# Patient Record
Sex: FEMALE | Race: WHITE | NOT HISPANIC OR LATINO | Employment: PART TIME | URBAN - NONMETROPOLITAN AREA
[De-identification: names, ages, dates, MRNs, and addresses within clinical notes are randomized per-mention and may not be internally consistent; named-entity substitution may affect disease eponyms.]

---

## 2020-10-17 ENCOUNTER — HOSPITAL ENCOUNTER (EMERGENCY)
Facility: HOSPITAL | Age: 40
Discharge: HOME | End: 2020-10-17
Attending: NURSE PRACTITIONER | Admitting: NURSE PRACTITIONER
Payer: OTHER GOVERNMENT

## 2020-10-17 VITALS
DIASTOLIC BLOOD PRESSURE: 86 MMHG | TEMPERATURE: 99.3 F | SYSTOLIC BLOOD PRESSURE: 113 MMHG | WEIGHT: 175 LBS | RESPIRATION RATE: 18 BRPM | OXYGEN SATURATION: 98 % | HEART RATE: 80 BPM

## 2020-10-17 DIAGNOSIS — Z20.822 SUSPECTED COVID-19 VIRUS INFECTION: ICD-10-CM

## 2020-10-17 DIAGNOSIS — Z72.0 TOBACCO ABUSE: ICD-10-CM

## 2020-10-17 DIAGNOSIS — J06.9 ACUTE URI: ICD-10-CM

## 2020-10-17 DIAGNOSIS — F17.210 CIGARETTE SMOKER: ICD-10-CM

## 2020-10-17 LAB
SPECIMEN SOURCE: NORMAL
STREP GROUP A PCR: NOT DETECTED

## 2020-10-17 PROCEDURE — U0003 INFECTIOUS AGENT DETECTION BY NUCLEIC ACID (DNA OR RNA); SEVERE ACUTE RESPIRATORY SYNDROME CORONAVIRUS 2 (SARS-COV-2) (CORONAVIRUS DISEASE [COVID-19]), AMPLIFIED PROBE TECHNIQUE, MAKING USE OF HIGH THROUGHPUT TECHNOLOGIES AS DESCRIBED BY CMS-2020-01-R: HCPCS | Performed by: NURSE PRACTITIONER

## 2020-10-17 PROCEDURE — G0463 HOSPITAL OUTPT CLINIC VISIT: HCPCS

## 2020-10-17 PROCEDURE — 99213 OFFICE O/P EST LOW 20 MIN: CPT | Performed by: NURSE PRACTITIONER

## 2020-10-17 PROCEDURE — C9803 HOPD COVID-19 SPEC COLLECT: HCPCS

## 2020-10-17 PROCEDURE — 87651 STREP A DNA AMP PROBE: CPT | Performed by: NURSE PRACTITIONER

## 2020-10-17 ASSESSMENT — ENCOUNTER SYMPTOMS
WHEEZING: 1
ARTHRALGIAS: 1
SORE THROAT: 1
CHEST TIGHTNESS: 0
VOMITING: 0
LIGHT-HEADEDNESS: 0
SINUS PAIN: 1
ABDOMINAL PAIN: 0
FATIGUE: 1
FEVER: 1
SINUS PRESSURE: 1
COUGH: 1
CHILLS: 1
SHORTNESS OF BREATH: 0
ACTIVITY CHANGE: 1
TROUBLE SWALLOWING: 0
FACIAL SWELLING: 0
EYE REDNESS: 0
NAUSEA: 0
DIZZINESS: 0
MYALGIAS: 1
DIARRHEA: 0
EYE PAIN: 0
HEADACHES: 1
EYE ITCHING: 0

## 2020-10-17 NOTE — ED PROVIDER NOTES
History     Chief Complaint   Patient presents with     Otitis Media     c/o bilateral ear pain starting Wednes day night     Fever     feels like it broke this am.      Pharyngitis     started Wednesday.      HPI  Fiona Mcclain is a 40 year old female who presents with her daughter with a fever, nasal congestion, fatigue, body aches, and a productive cough times 3 days. Highest temp was 101 orally. Still eating and drinking well. Some loss of smell and taste. No one else is ill around her, but she does work at [employer]. She has taken Motrin with some relief in her symptoms. She does smoke. No h/o asthma or COPD.     Allergies:  Allergies   Allergen Reactions     Allegra [Fexofenadine]        Problem List:    There are no active problems to display for this patient.       Past Medical History:    History reviewed. No pertinent past medical history.    Past Surgical History:    History reviewed. No pertinent surgical history.    Family History:    No family history on file.    Social History:  Marital Status:   [2]  Social History     Tobacco Use     Smoking status: None   Substance Use Topics     Alcohol use: None     Drug use: None        Medications:    No current outpatient medications on file.        Review of Systems   Constitutional: Positive for activity change, chills, fatigue and fever.   HENT: Positive for congestion, ear pain, sinus pressure, sinus pain, sneezing and sore throat. Negative for ear discharge, facial swelling, nosebleeds and trouble swallowing.    Eyes: Negative for pain, redness and itching.   Respiratory: Positive for cough and wheezing. Negative for chest tightness and shortness of breath.    Cardiovascular: Negative for chest pain.   Gastrointestinal: Negative for abdominal pain, diarrhea, nausea and vomiting.   Musculoskeletal: Positive for arthralgias and myalgias.   Skin: Negative for rash.   Neurological: Positive for headaches. Negative for dizziness and  light-headedness.       Physical Exam   BP: 113/86  Pulse: 80  Temp: 99.3  F (37.4  C)  Resp: 18  Weight: 79.4 kg (175 lb)  SpO2: 98 %      Physical Exam  Vitals signs and nursing note reviewed.   Constitutional:       General: She is not in acute distress.     Appearance: She is not ill-appearing, toxic-appearing or diaphoretic.   HENT:      Head: Normocephalic and atraumatic.      Right Ear: Tympanic membrane and ear canal normal. No drainage, swelling or tenderness. No middle ear effusion.      Left Ear: Tympanic membrane and ear canal normal. No drainage, swelling or tenderness.  No middle ear effusion.      Nose: No congestion.      Mouth/Throat:      Mouth: Mucous membranes are moist.      Pharynx: Oropharynx is clear. No oropharyngeal exudate or posterior oropharyngeal erythema.   Eyes:      Conjunctiva/sclera: Conjunctivae normal.      Pupils: Pupils are equal, round, and reactive to light.   Neck:      Musculoskeletal: Normal range of motion and neck supple.   Cardiovascular:      Rate and Rhythm: Normal rate and regular rhythm.      Heart sounds: Normal heart sounds.   Pulmonary:      Effort: Pulmonary effort is normal. No respiratory distress.      Breath sounds: Normal breath sounds. No wheezing.   Abdominal:      Palpations: Abdomen is soft.      Tenderness: There is no abdominal tenderness.   Neurological:      General: No focal deficit present.      Mental Status: She is alert.   Psychiatric:         Mood and Affect: Mood normal.         Behavior: Behavior normal.         ED Course        No results found for this or any previous visit (from the past 24 hour(s)).    Medications - No data to display    Assessments & Plan (with Medical Decision Making)     I have reviewed the nursing notes.    I have reviewed the findings, diagnosis, plan and need for follow up with the patient.  (Z20.828) Suspected COVID-19 virus infection  (J06.9) Acute URI  (Z72.0) Tobacco abuse  Plan: Most likely Covid positive.  Swab collected. Will quarantine until results available. Does not appear in distress. Oxygen sats 98 percent. Symptomatic cares encouraged. The lack of efficacy of antibiotics was discussed. The patient will follow up with new or worsening symptoms. Smoking cessation encouraged.       There are no discharge medications for this patient.      Final diagnoses:   Suspected COVID-19 virus infection   Acute URI   Tobacco abuse       10/17/2020   [facility]     Perla Yuan NP  10/17/20 [id number]

## 2020-10-17 NOTE — DISCHARGE INSTRUCTIONS
Quarantine at home until Covid swab is back. If positive, needs to stay home for 10 days and be fever free times 24 hours until returning to work. Return here with new or worsening symptoms.

## 2020-10-17 NOTE — ED TRIAGE NOTES
"Pt is here with c/o fever 100.1 last three days \"broke this am\"  Congestion, cough onset yesterday   throat pain   Body aches     "

## 2020-10-17 NOTE — LETTER
October 17, 2020      Rigobertoiferjovanny Mcclain  [address]  [place] [address]        To Whom It May Concern:    Rigobertoiferjovanny Mcclain  was seen on 10/17/2020.  Please excuse her until she has a negative Covid swab. If positive, needs to stay home for 10 days and also be fever free times 24 hours.         Sincerely,        Perla Yuna, CNP

## 2020-10-17 NOTE — ED AVS SNAPSHOT
[facility]  [address]  [place] [address]  Phone: [phone]                                    Fiona Mcclain   MRN: [id number]    Department: [facility]   Date of Visit: 10/17/2020           After Visit Summary Signature Page    I have received my discharge instructions, and my questions have been answered. I have discussed any challenges I see with this plan with the nurse or doctor.    ..........................................................................................................................................  Patient/Patient Representative Signature      ..........................................................................................................................................  Patient Representative Print Name and Relationship to Patient    ..................................................               ................................................  Date                                   Time    ..........................................................................................................................................  Reviewed by Signature/Title    ...................................................              ..............................................  Date                                               Time          22EPIC Rev 08/18

## 2020-10-19 ENCOUNTER — TELEPHONE (OUTPATIENT)
Dept: NURSING | Facility: CLINIC | Age: 40
End: 2020-10-19

## 2020-10-19 LAB
SARS-COV-2 RNA SPEC QL NAA+PROBE: ABNORMAL
SPECIMEN SOURCE: ABNORMAL

## 2020-10-20 ENCOUNTER — PATIENT OUTREACH (OUTPATIENT)
Dept: CARE COORDINATION | Facility: CLINIC | Age: 40
End: 2020-10-20

## 2020-10-20 DIAGNOSIS — U07.1 2019 NOVEL CORONAVIRUS DISEASE (COVID-19): Primary | ICD-10-CM

## 2020-10-20 NOTE — PROGRESS NOTES
Clinic Care Coordination Contact    Clinic Care Coordination Contact     Follow Up Progress Note      Reason for Referral:      Intervention/Education provided during outreach: CHW spoke with patient. Reviewed Hospital COVID discharge instructions. Patient  DID NOT accept assistance from CHW to schedule PCP follow up appointment with PCP at [facility].      Plan: Patient is wanting to look into her insurance to see who is in network and will find a PCP on her own.     No further Outreach will be done at this time.

## 2020-10-20 NOTE — TELEPHONE ENCOUNTER
"Coronavirus (COVID-19) Notification    Caller Name (Patient, parent, daughter/son, grandparent, etc)  Patient: Fiona Mcclain    Reason for call  Notify of Positive Coronavirus (COVID-19) lab results, assess symptoms,  review  [facility] recommendations    Lab Result    Lab test:  2019-nCoV rRt-PCR or SARS-CoV-2 PCR    Oropharyngeal AND/OR nasopharyngeal swabs is POSITIVE for 2019-nCoV RNA/SARS-COV-2 PCR (COVID-19 virus)    RN Recommendations/Instructions per [facility] Coronavirus COVID-19 recommendations    Brief introduction script  Introduce self then review script:  \"I am calling on behalf of [employer].  We were notified that your Coronavirus test (COVID-19) for was POSITIVE for the virus.  I have some information to relay to you but first I wanted to mention that the [place] Dept of Health will be contacting you shortly [it's possible MD already called Patient] to talk to you more about how you are feeling and other people you have had contact with who might now also have the virus.  Also,  [facility] is Partnering with the [facility] for Covid-19 research, you may be contacted directly by research staff.\"    Assessment (Inquire about Patient's current symptoms)   Assessment   Current Symptoms at time of phone call: (if no symptoms, document No symptoms] Loss of smell and taste, headache   Symptoms onset (if applicable) 10/14/2020     If at time of call, Patients symptoms hare worsened, the Patient should contact 911 or have someone drive them to Emergency Dept promptly:      If Patient calling 911, inform 911 personal that you have tested positive for the Coronavirus (COVID-19).  Place mask on and await 911 to arrive.    If Emergency Dept, If possible, please have another adult drive you to the Emergency Dept but you need to wear mask when in contact with other people.      Review information with Patient    Your result was positive. This means you have COVID-19 (coronavirus).  " We have sent you a letter that reviews the information that I'll be reviewing with you now.    How can I protect others?    If you have symptoms: stay home and away from others (self-isolate) until:    You've had no fever--and no medicine that reduces fever--for 1 full day (24 hours). And       Your other symptoms have gotten better. For example, your cough or breathing has improved. And     At least 10 days have passed since your symptoms started. (If you've been told by a doctor that you have a weak immune system, wait 20 days.)     If you don't have symptoms: Stay home and away from others (self-isolate) until at least 10 days have passed since your first positive COVID-19 test. (Date test collected)    During this time:    Stay in your own room, including for meals. Use your own bathroom if you can.    Stay away from others in your home. No hugging, kissing or shaking hands. No visitors.     Don't go to work, school or anywhere else.     Clean  high touch  surfaces often (doorknobs, counters, handles, etc.). Use a household cleaning spray or wipes. You'll find a full list on the EPA website at www.epa.gov/pesticide-registration/list-n-disinfectants-use-against-sars-cov-2.     Cover your mouth and nose with a mask, tissue or other face covering to avoid spreading germs.    Wash your hands and face often with soap and water.    Caregivers in these groups are at risk for severe illness due to COVID-19:  o People 65 years and older  o People who live in a nursing home or long-term care facility  o People with chronic disease (lung, heart, cancer, diabetes, kidney, liver, immunologic)  o People who have a weakened immune system, including those who:  - Are in cancer treatment  - Take medicine that weakens the immune system, such as corticosteroids  - Had a bone marrow or organ transplant  - Have an immune deficiency  - Have poorly controlled HIV or AIDS  - Are obese (body mass index of 40 or higher)  - Smoke  regularly    Caregivers should wear gloves while washing dishes, handling laundry and cleaning bedrooms and bathrooms.    Wash and dry laundry with special caution. Don't shake dirty laundry, and use the warmest water setting you can.    If you have a weakened immune system, ask your doctor about other actions you should take.    For more tips, go to www.cdc.gov/coronavirus/2019-ncov/downloads/10Things.pdf.    You should not go back to work until you meet the guidelines above for ending your home isolation. You don't need to be retested for COVID-19 before going back to work--studies show that you won't spread the virus if it's been at least 10 days since your symptoms started (or 20 days, if you have a weak immune system).    Employers: This document serves as formal notice of your employee's medical guidelines for going back to work. They must meet the above guidelines before going back to work in person.    How can I take care of myself?    1. Get lots of rest. Drink extra fluids (unless a doctor has told you not to).    2. Take Tylenol (acetaminophen) for fever or pain. If you have liver or kidney problems, ask your family doctor if it's okay to take Tylenol.     Take either:     650 mg (two 325 mg pills) every 4 to 6 hours, or     1,000 mg (two 500 mg pills) every 8 hours as needed.     Note: Don't take more than 3,000 mg in one day. Acetaminophen is found in many medicines (both prescribed and over-the-counter medicines). Read all labels to be sure you don't take too much.    For children, check the Tylenol bottle for the right dose (based on their age or weight).    3. If you have other health problems (like cancer, heart failure, an organ transplant or severe kidney disease): Call your specialty clinic if you don't feel better in the next 2 days.    4. Know when to call 911: Emergency warning signs include:    Trouble breathing or shortness of breath    Pain or pressure in the chest that doesn't go  away    Feeling confused like you haven't felt before, or not being able to wake up    Bluish-colored lips or face    5. Sign up for [employer]. We know it's scary to hear that you have COVID-19. We want to track your symptoms to make sure you're okay over the next 2 weeks. Please look for an email from [employer]--this is a free, online program that we'll use to keep in touch. To sign up, follow the link in the email. Learn more at [email or web address].    Where can I get more information?    [facility]: [email or web address]/    Coronavirus Basics: www.health.[facility].[place]./diseases/coronavirus/basics.html    What to Do If You're Sick: www.cdc.gov/coronavirus/2019-ncov/about/steps-when-sick.html    Ending Home Isolation: www.cdc.gov/coronavirus/2019-ncov/hcp/disposition-in-home-patients.html     Caring for Someone with COVID-19: www.cdc.gov/coronavirus/2019-ncov/if-you-are-sick/care-for-someone.html     [facility] clinical trials (COVID-19 research studies): clinicalaffairs.[facility]/n-clinical-trials     A Positive COVID-19 letter will be sent via [employer] or the mail. (Exception, no letters sent to Presurgerical/Preprocedure Patients)    [Name]  Redd Cobos RN  [facility]

## 2021-11-24 ENCOUNTER — TELEPHONE (OUTPATIENT)
Dept: FAMILY MEDICINE | Facility: OTHER | Age: 41
End: 2021-11-24
Payer: OTHER GOVERNMENT

## 2021-11-24 NOTE — TELEPHONE ENCOUNTER
Tried calling pt to schedule a covid swab for an upcoming procedure. Left message to call [phone] ext [phone] to schedule.    DOS 12-13-21 @ [facility] w/ Dr Murdock (draw date 12-9-21)  *Did see pt was scheduled at [facility] for this one but can be scheduled here if pt doesn't want to go to [place] for it.      DOS 12-13-21 @ [facility] w/ Dr Murdock FAX RESULTS [phone] (draw date 12-26-21)

## 2021-11-29 NOTE — H&P (VIEW-ONLY)
[facility]  [address]  [place] [address]  Phone: [phone]  Fax: [phone]  Primary Provider: No Ref-Primary, Physician  Pre-op Performing Provider: LANEY SINGLETARY      PREOPERATIVE EVALUATION:  Today's date: 11/30/2021    Dia Mcclain is a 41 year old female who presents for a preoperative evaluation.    Surgical Information:  Surgery/Procedure: Left RELEASE, CARPAL TUNNEL  Surgery Location: [facility]  Surgeon: Mathieu  Surgery Date: 12/13/2021  Time of Surgery: TBD  Where patient plans to recover: At home with family  Fax number for surgical facility: Note does not need to be faxed, will be available electronically in Epic.    Type of Anesthesia Anticipated: General    Assessment & Plan     The proposed surgical procedure is considered INTERMEDIATE risk.    1. Preop general physical exam    2. Bilateral carpal tunnel syndrome      Covid swab ordered will be completed on 12/9.  Labs stable today.  EKG not indicated due to patient age and health status.    Risks and Recommendations:  The patient has the following additional risks and recommendations for perioperative complications:   - No identified additional risk factors other than previously addressed    Medication Instructions:  Patient is on no chronic medications    RECOMMENDATION:  APPROVAL GIVEN to proceed with proposed procedure, without further diagnostic evaluation.    Laney Singletary PA-C on 11/29/2021 at 8:47 AM    Subjective     HPI related to upcoming procedure:   Diagnosed with bilateral carpal tunnel syndrome. Met with orthopedics who is recommending a left CTR on 12/13 and will have right completed on 12/30.  Has been managing pain with physical therapy in the past and over-the-counter analgesics more recently.      Preop Questions 11/30/2021   1. Have you ever had a heart attack or stroke? No   2. Have you ever had surgery on your heart or blood vessels, such as a stent placement, a coronary  artery bypass, or surgery on an artery in your head, neck, heart, or legs? No   3. Do you have chest pain with activity? No   4. Do you have a history of  heart failure? No   5. Do you currently have a cold, bronchitis or symptoms of other infection? No   6. Do you have a cough, shortness of breath, or wheezing? No   7. Do you or anyone in your family have previous history of blood clots? No   8. Do you or does anyone in your family have a serious bleeding problem such as prolonged bleeding following surgeries or cuts? No   9. Have you ever had problems with anemia or been told to take iron pills? No   10. Have you had any abnormal blood loss such as black, tarry or bloody stools, or abnormal vaginal bleeding? No   11. Have you ever had a blood transfusion? No   12. Are you willing to have a blood transfusion if it is medically needed before, during, or after your surgery? Yes   13. Have you or any of your relatives ever had problems with anesthesia? No   14. Do you have sleep apnea, excessive snoring or daytime drowsiness? No   15. Do you have any artifical heart valves or other implanted medical devices like a pacemaker, defibrillator, or continuous glucose monitor? No   16. Do you have artificial joints? No   17. Are you allergic to latex? No   18. Is there any chance that you may be pregnant? No       Health Care Directive:  Patient does not have a Health Care Directive or Living Will: Discussed advance care planning with patient; however, patient declined at this time.    Preoperative Review of :   reviewed - no record of controlled substances prescribed.    Status of Chronic Conditions:  See problem list for active medical problems.  Problems all longstanding and stable, except as noted/documented.  See ROS for pertinent symptoms related to these conditions.      Review of Systems  CONSTITUTIONAL: NEGATIVE for fever, chills, change in weight  INTEGUMENTARY/SKIN: NEGATIVE for worrisome rashes, moles or  "lesions  EYES: NEGATIVE for vision changes or irritation  ENT/MOUTH: NEGATIVE for ear, mouth and throat problems  RESP: NEGATIVE for significant cough or SOB  CV: NEGATIVE for chest pain, palpitations or peripheral edema  GI: NEGATIVE for nausea, abdominal pain, heartburn, or change in bowel habits  : NEGATIVE for frequency, dysuria, or hematuria  MUSCULOSKELETAL:POSITIVE  for bilateral wrist and hand pain and tingling  NEURO: NEGATIVE for weakness, dizziness or paresthesias  ENDOCRINE: NEGATIVE for temperature intolerance, skin/hair changes  HEME: NEGATIVE for bleeding problems  PSYCHIATRIC: NEGATIVE for changes in mood or affect    There are no problems to display for this patient.     No past medical history on file.  No past surgical history on file.  Current Outpatient Medications   Medication Sig Dispense Refill     ibuprofen (ADVIL/MOTRIN) 200 MG tablet Take 200 mg by mouth every 4 hours as needed for mild pain       vitamin B-Complex Take 1 tablet by mouth daily         Allergies   Allergen Reactions     Allegra [Fexofenadine]         Social History     Tobacco Use     Smoking status: Current Every Day Smoker     Packs/day: 0.50     Types: Cigarettes     Smokeless tobacco: Never Used   Substance Use Topics     Alcohol use: Not Currently     No family history on file.  History   Drug Use Unknown         Objective     /68   Pulse 73   Temp 97.3  F (36.3  C)   Resp 14   Ht 1.664 m (5' 5.5\")   Wt 81.9 kg (180 lb 9.6 oz)   LMP 11/15/2021   SpO2 98%   Breastfeeding No   BMI 29.60 kg/m      Physical Exam    GENERAL APPEARANCE: healthy, alert and no distress     EYES: EOMI, PERRL     HENT: ear canals and TM's normal and nose and mouth without ulcers or lesions     NECK: no adenopathy, no asymmetry, masses, or scars and thyroid normal to palpation     RESP: lungs clear to auscultation - no rales, rhonchi or wheezes     CV: regular rates and rhythm, normal S1 S2, no S3 or S4 and no murmur, click or " rub     PSYCH: mentation appears normal. and affect normal/bright     LYMPHATICS: No cervical adenopathy    No results for input(s): HGB, PLT, INR, NA, POTASSIUM, CR, A1C in the last 03513 hours.     Diagnostics:  Labs pending at this time.  Results will be reviewed when available.   No EKG required for low risk surgery (cataract, skin procedure, breast biopsy, etc).     Results for orders placed or performed in visit on 11/30/21   Basic Metabolic Panel     Status: Normal   Result Value Ref Range    Sodium 141 134 - 144 mmol/L    Potassium 3.7 3.5 - 5.1 mmol/L    Chloride 106 98 - 107 mmol/L    Carbon Dioxide (CO2) 28 21 - 31 mmol/L    Anion Gap 7 3 - 14 mmol/L    Urea Nitrogen 9 7 - 25 mg/dL    Creatinine 0.86 0.60 - 1.20 mg/dL    Calcium 9.6 8.6 - 10.3 mg/dL    Glucose 92 70 - 105 mg/dL    GFR Estimate 84 >60 mL/min/1.73m2   CBC with platelets and differential     Status: Abnormal   Result Value Ref Range    WBC Count 9.0 4.0 - 11.0 10e3/uL    RBC Count 4.43 3.80 - 5.20 10e6/uL    Hemoglobin 11.8 11.7 - 15.7 g/dL    Hematocrit 35.3 35.0 - 47.0 %    MCV 80 78 - 100 fL    MCH 26.6 26.5 - 33.0 pg    MCHC 33.4 31.5 - 36.5 g/dL    RDW 16.4 (H) 10.0 - 15.0 %    Platelet Count 266 150 - 450 10e3/uL    % Neutrophils 63 %    % Lymphocytes 27 %    % Monocytes 9 %    % Eosinophils 0 %    % Basophils 1 %    % Immature Granulocytes 0 %    NRBCs per 100 WBC 0 <1 /100    Absolute Neutrophils 5.8 1.6 - 8.3 10e3/uL    Absolute Lymphocytes 2.4 0.8 - 5.3 10e3/uL    Absolute Monocytes 0.8 0.0 - 1.3 10e3/uL    Absolute Eosinophils 0.0 0.0 - 0.7 10e3/uL    Absolute Basophils 0.1 0.0 - 0.2 10e3/uL    Absolute Immature Granulocytes 0.0 <=0.4 10e3/uL    Absolute NRBCs 0.0 10e3/uL   CBC and Differential     Status: Abnormal    Narrative    The following orders were created for panel order CBC and Differential.  Procedure                               Abnormality         Status                     ---------                                -----------         ------                     CBC with platelets and d...[[id number]]  Abnormal            Final result                 Please view results for these tests on the individual orders.         Revised Cardiac Risk Index (RCRI):  The patient has the following serious cardiovascular risks for perioperative complications:   - No serious cardiac risks = 0 points     RCRI Interpretation: 0 points: Class I (very low risk - 0.4% complication rate)           Signed Electronically by: Willow Restrepo PA-C  Copy of this evaluation report is provided to requesting physician.

## 2021-11-29 NOTE — PATIENT INSTRUCTIONS

## 2021-11-30 ENCOUNTER — OFFICE VISIT (OUTPATIENT)
Dept: FAMILY MEDICINE | Facility: OTHER | Age: 41
End: 2021-11-30
Attending: PHYSICIAN ASSISTANT
Payer: OTHER GOVERNMENT

## 2021-11-30 VITALS
WEIGHT: 180.6 LBS | HEART RATE: 73 BPM | OXYGEN SATURATION: 98 % | RESPIRATION RATE: 14 BRPM | TEMPERATURE: 97.3 F | BODY MASS INDEX: 29.02 KG/M2 | SYSTOLIC BLOOD PRESSURE: 118 MMHG | HEIGHT: 66 IN | DIASTOLIC BLOOD PRESSURE: 68 MMHG

## 2021-11-30 DIAGNOSIS — Z01.818 PREOP GENERAL PHYSICAL EXAM: Primary | ICD-10-CM

## 2021-11-30 DIAGNOSIS — G56.03 BILATERAL CARPAL TUNNEL SYNDROME: ICD-10-CM

## 2021-11-30 LAB
ANION GAP SERPL CALCULATED.3IONS-SCNC: 7 MMOL/L (ref 3–14)
BASOPHILS # BLD AUTO: 0.1 10E3/UL (ref 0–0.2)
BASOPHILS NFR BLD AUTO: 1 %
BUN SERPL-MCNC: 9 MG/DL (ref 7–25)
CALCIUM SERPL-MCNC: 9.6 MG/DL (ref 8.6–10.3)
CHLORIDE BLD-SCNC: 106 MMOL/L (ref 98–107)
CO2 SERPL-SCNC: 28 MMOL/L (ref 21–31)
CREAT SERPL-MCNC: 0.86 MG/DL (ref 0.6–1.2)
EOSINOPHIL # BLD AUTO: 0 10E3/UL (ref 0–0.7)
EOSINOPHIL NFR BLD AUTO: 0 %
ERYTHROCYTE [DISTWIDTH] IN BLOOD BY AUTOMATED COUNT: 16.4 % (ref 10–15)
GFR SERPL CREATININE-BSD FRML MDRD: 84 ML/MIN/1.73M2
GLUCOSE BLD-MCNC: 92 MG/DL (ref 70–105)
HCT VFR BLD AUTO: 35.3 % (ref 35–47)
HGB BLD-MCNC: 11.8 G/DL (ref 11.7–15.7)
IMM GRANULOCYTES # BLD: 0 10E3/UL
IMM GRANULOCYTES NFR BLD: 0 %
LYMPHOCYTES # BLD AUTO: 2.4 10E3/UL (ref 0.8–5.3)
LYMPHOCYTES NFR BLD AUTO: 27 %
MCH RBC QN AUTO: 26.6 PG (ref 26.5–33)
MCHC RBC AUTO-ENTMCNC: 33.4 G/DL (ref 31.5–36.5)
MCV RBC AUTO: 80 FL (ref 78–100)
MONOCYTES # BLD AUTO: 0.8 10E3/UL (ref 0–1.3)
MONOCYTES NFR BLD AUTO: 9 %
NEUTROPHILS # BLD AUTO: 5.8 10E3/UL (ref 1.6–8.3)
NEUTROPHILS NFR BLD AUTO: 63 %
NRBC # BLD AUTO: 0 10E3/UL
NRBC BLD AUTO-RTO: 0 /100
PLATELET # BLD AUTO: 266 10E3/UL (ref 150–450)
POTASSIUM BLD-SCNC: 3.7 MMOL/L (ref 3.5–5.1)
RBC # BLD AUTO: 4.43 10E6/UL (ref 3.8–5.2)
SODIUM SERPL-SCNC: 141 MMOL/L (ref 134–144)
WBC # BLD AUTO: 9 10E3/UL (ref 4–11)

## 2021-11-30 PROCEDURE — 99213 OFFICE O/P EST LOW 20 MIN: CPT | Performed by: PHYSICIAN ASSISTANT

## 2021-11-30 PROCEDURE — 85025 COMPLETE CBC W/AUTO DIFF WBC: CPT | Mod: ZL | Performed by: PHYSICIAN ASSISTANT

## 2021-11-30 PROCEDURE — 36415 COLL VENOUS BLD VENIPUNCTURE: CPT | Mod: ZL | Performed by: PHYSICIAN ASSISTANT

## 2021-11-30 PROCEDURE — 80048 BASIC METABOLIC PNL TOTAL CA: CPT | Mod: ZL | Performed by: PHYSICIAN ASSISTANT

## 2021-11-30 RX ORDER — IBUPROFEN 200 MG
200 TABLET ORAL EVERY 4 HOURS PRN
COMMUNITY

## 2021-11-30 ASSESSMENT — PAIN SCALES - GENERAL: PAINLEVEL: MILD PAIN (3)

## 2021-11-30 ASSESSMENT — ANXIETY QUESTIONNAIRES
GAD7 TOTAL SCORE: 0
5. BEING SO RESTLESS THAT IT IS HARD TO SIT STILL: NOT AT ALL
6. BECOMING EASILY ANNOYED OR IRRITABLE: NOT AT ALL
1. FEELING NERVOUS, ANXIOUS, OR ON EDGE: NOT AT ALL
6. BECOMING EASILY ANNOYED OR IRRITABLE: NOT AT ALL
IF YOU CHECKED OFF ANY PROBLEMS ON THIS QUESTIONNAIRE, HOW DIFFICULT HAVE THESE PROBLEMS MADE IT FOR YOU TO DO YOUR WORK, TAKE CARE OF THINGS AT HOME, OR GET ALONG WITH OTHER PEOPLE: NOT DIFFICULT AT ALL
7. FEELING AFRAID AS IF SOMETHING AWFUL MIGHT HAPPEN: NOT AT ALL
5. BEING SO RESTLESS THAT IT IS HARD TO SIT STILL: NOT AT ALL
2. NOT BEING ABLE TO STOP OR CONTROL WORRYING: NOT AT ALL
2. NOT BEING ABLE TO STOP OR CONTROL WORRYING: NOT AT ALL
IF YOU CHECKED OFF ANY PROBLEMS ON THIS QUESTIONNAIRE, HOW DIFFICULT HAVE THESE PROBLEMS MADE IT FOR YOU TO DO YOUR WORK, TAKE CARE OF THINGS AT HOME, OR GET ALONG WITH OTHER PEOPLE: NOT DIFFICULT AT ALL
7. FEELING AFRAID AS IF SOMETHING AWFUL MIGHT HAPPEN: NOT AT ALL
GAD7 TOTAL SCORE: 0
3. WORRYING TOO MUCH ABOUT DIFFERENT THINGS: NOT AT ALL
3. WORRYING TOO MUCH ABOUT DIFFERENT THINGS: NOT AT ALL
1. FEELING NERVOUS, ANXIOUS, OR ON EDGE: NOT AT ALL

## 2021-11-30 ASSESSMENT — MIFFLIN-ST. JEOR: SCORE: 1493.01

## 2021-11-30 ASSESSMENT — PATIENT HEALTH QUESTIONNAIRE - PHQ9
5. POOR APPETITE OR OVEREATING: NOT AT ALL
5. POOR APPETITE OR OVEREATING: NOT AT ALL

## 2021-11-30 NOTE — NURSING NOTE
Patient presents to clinic for Pre-Op Exam.  Ruthy Martínez LPN ....................  11/30/2021   3:06 PM

## 2021-12-01 ASSESSMENT — ANXIETY QUESTIONNAIRES: GAD7 TOTAL SCORE: 0

## 2021-12-09 ENCOUNTER — OFFICE VISIT (OUTPATIENT)
Dept: FAMILY MEDICINE | Facility: OTHER | Age: 41
End: 2021-12-09
Attending: ORTHOPAEDIC SURGERY
Payer: OTHER GOVERNMENT

## 2021-12-09 DIAGNOSIS — Z01.818 PREOP GENERAL PHYSICAL EXAM: ICD-10-CM

## 2021-12-09 PROCEDURE — U0005 INFEC AGEN DETEC AMPLI PROBE: HCPCS

## 2021-12-09 PROCEDURE — U0003 INFECTIOUS AGENT DETECTION BY NUCLEIC ACID (DNA OR RNA); SEVERE ACUTE RESPIRATORY SYNDROME CORONAVIRUS 2 (SARS-COV-2) (CORONAVIRUS DISEASE [COVID-19]), AMPLIFIED PROBE TECHNIQUE, MAKING USE OF HIGH THROUGHPUT TECHNOLOGIES AS DESCRIBED BY CMS-2020-01-R: HCPCS

## 2021-12-10 ENCOUNTER — ANESTHESIA EVENT (OUTPATIENT)
Dept: SURGERY | Facility: OTHER | Age: 41
End: 2021-12-10
Payer: OTHER GOVERNMENT

## 2021-12-10 LAB — SARS-COV-2 RNA RESP QL NAA+PROBE: NEGATIVE

## 2021-12-13 ENCOUNTER — HOSPITAL ENCOUNTER (OUTPATIENT)
Facility: OTHER | Age: 41
Discharge: HOME | End: 2021-12-13
Attending: ORTHOPAEDIC SURGERY | Admitting: ORTHOPAEDIC SURGERY
Payer: OTHER GOVERNMENT

## 2021-12-13 ENCOUNTER — ANESTHESIA (OUTPATIENT)
Dept: SURGERY | Facility: OTHER | Age: 41
End: 2021-12-13
Payer: OTHER GOVERNMENT

## 2021-12-13 VITALS
HEIGHT: 66 IN | OXYGEN SATURATION: 97 % | DIASTOLIC BLOOD PRESSURE: 71 MMHG | RESPIRATION RATE: 16 BRPM | BODY MASS INDEX: 28.93 KG/M2 | SYSTOLIC BLOOD PRESSURE: 100 MMHG | TEMPERATURE: 97.7 F | WEIGHT: 180 LBS | HEART RATE: 67 BPM

## 2021-12-13 DIAGNOSIS — G56.01 CARPAL TUNNEL SYNDROME OF RIGHT WRIST: Primary | ICD-10-CM

## 2021-12-13 PROCEDURE — 250N000009 HC RX 250: Performed by: ORTHOPAEDIC SURGERY

## 2021-12-13 PROCEDURE — 710N000012 HC RECOVERY PHASE 2, PER MINUTE: Performed by: ORTHOPAEDIC SURGERY

## 2021-12-13 PROCEDURE — 64721 CARPAL TUNNEL SURGERY: CPT | Performed by: NURSE ANESTHETIST, CERTIFIED REGISTERED

## 2021-12-13 PROCEDURE — 272N000001 HC OR GENERAL SUPPLY STERILE: Performed by: ORTHOPAEDIC SURGERY

## 2021-12-13 PROCEDURE — 258N000003 HC RX IP 258 OP 636: Performed by: NURSE ANESTHETIST, CERTIFIED REGISTERED

## 2021-12-13 PROCEDURE — 64721 CARPAL TUNNEL SURGERY: CPT | Mod: RT | Performed by: ORTHOPAEDIC SURGERY

## 2021-12-13 PROCEDURE — 250N000011 HC RX IP 250 OP 636: Performed by: NURSE ANESTHETIST, CERTIFIED REGISTERED

## 2021-12-13 PROCEDURE — 250N000011 HC RX IP 250 OP 636: Performed by: ORTHOPAEDIC SURGERY

## 2021-12-13 PROCEDURE — 250N000009 HC RX 250: Performed by: NURSE ANESTHETIST, CERTIFIED REGISTERED

## 2021-12-13 PROCEDURE — 999N000141 HC STATISTIC PRE-PROCEDURE NURSING ASSESSMENT: Performed by: ORTHOPAEDIC SURGERY

## 2021-12-13 PROCEDURE — 360N000075 HC SURGERY LEVEL 2, PER MIN: Performed by: ORTHOPAEDIC SURGERY

## 2021-12-13 PROCEDURE — 370N000017 HC ANESTHESIA TECHNICAL FEE, PER MIN: Performed by: ORTHOPAEDIC SURGERY

## 2021-12-13 RX ORDER — SODIUM CHLORIDE, SODIUM LACTATE, POTASSIUM CHLORIDE, CALCIUM CHLORIDE 600; 310; 30; 20 MG/100ML; MG/100ML; MG/100ML; MG/100ML
INJECTION, SOLUTION INTRAVENOUS CONTINUOUS
Status: DISCONTINUED | OUTPATIENT
Start: 2021-12-13 | End: 2021-12-13 | Stop reason: HOSPADM

## 2021-12-13 RX ORDER — NALOXONE HYDROCHLORIDE 0.4 MG/ML
0.2 INJECTION, SOLUTION INTRAMUSCULAR; INTRAVENOUS; SUBCUTANEOUS
Status: DISCONTINUED | OUTPATIENT
Start: 2021-12-13 | End: 2021-12-13 | Stop reason: HOSPADM

## 2021-12-13 RX ORDER — PROPOFOL 10 MG/ML
INJECTION, EMULSION INTRAVENOUS CONTINUOUS PRN
Status: DISCONTINUED | OUTPATIENT
Start: 2021-12-13 | End: 2021-12-13

## 2021-12-13 RX ORDER — MEPERIDINE HYDROCHLORIDE 50 MG/ML
12.5 INJECTION INTRAMUSCULAR; INTRAVENOUS; SUBCUTANEOUS
Status: DISCONTINUED | OUTPATIENT
Start: 2021-12-13 | End: 2021-12-13 | Stop reason: HOSPADM

## 2021-12-13 RX ORDER — PROPOFOL 10 MG/ML
INJECTION, EMULSION INTRAVENOUS PRN
Status: DISCONTINUED | OUTPATIENT
Start: 2021-12-13 | End: 2021-12-13

## 2021-12-13 RX ORDER — DEXAMETHASONE SODIUM PHOSPHATE 4 MG/ML
INJECTION, SOLUTION INTRA-ARTICULAR; INTRALESIONAL; INTRAMUSCULAR; INTRAVENOUS; SOFT TISSUE PRN
Status: DISCONTINUED | OUTPATIENT
Start: 2021-12-13 | End: 2021-12-13

## 2021-12-13 RX ORDER — HYDROCODONE BITARTRATE AND ACETAMINOPHEN 5; 325 MG/1; MG/1
1 TABLET ORAL
Status: DISCONTINUED | OUTPATIENT
Start: 2021-12-13 | End: 2021-12-13 | Stop reason: HOSPADM

## 2021-12-13 RX ORDER — LIDOCAINE 40 MG/G
CREAM TOPICAL
Status: DISCONTINUED | OUTPATIENT
Start: 2021-12-13 | End: 2021-12-13 | Stop reason: HOSPADM

## 2021-12-13 RX ORDER — ONDANSETRON 2 MG/ML
4 INJECTION INTRAMUSCULAR; INTRAVENOUS EVERY 30 MIN PRN
Status: DISCONTINUED | OUTPATIENT
Start: 2021-12-13 | End: 2021-12-13 | Stop reason: HOSPADM

## 2021-12-13 RX ORDER — HYDROCODONE BITARTRATE AND ACETAMINOPHEN 5; 325 MG/1; MG/1
1-2 TABLET ORAL EVERY 4 HOURS PRN
Qty: 6 TABLET | Refills: 0 | Status: SHIPPED | OUTPATIENT
Start: 2021-12-13

## 2021-12-13 RX ORDER — FENTANYL CITRATE 50 UG/ML
INJECTION, SOLUTION INTRAMUSCULAR; INTRAVENOUS PRN
Status: DISCONTINUED | OUTPATIENT
Start: 2021-12-13 | End: 2021-12-13

## 2021-12-13 RX ORDER — LIDOCAINE HYDROCHLORIDE 20 MG/ML
INJECTION, SOLUTION INFILTRATION; PERINEURAL PRN
Status: DISCONTINUED | OUTPATIENT
Start: 2021-12-13 | End: 2021-12-13

## 2021-12-13 RX ORDER — HYDROMORPHONE HYDROCHLORIDE 1 MG/ML
0.2 INJECTION, SOLUTION INTRAMUSCULAR; INTRAVENOUS; SUBCUTANEOUS EVERY 5 MIN PRN
Status: DISCONTINUED | OUTPATIENT
Start: 2021-12-13 | End: 2021-12-13 | Stop reason: HOSPADM

## 2021-12-13 RX ORDER — NALOXONE HYDROCHLORIDE 0.4 MG/ML
0.4 INJECTION, SOLUTION INTRAMUSCULAR; INTRAVENOUS; SUBCUTANEOUS
Status: DISCONTINUED | OUTPATIENT
Start: 2021-12-13 | End: 2021-12-13 | Stop reason: HOSPADM

## 2021-12-13 RX ORDER — FENTANYL CITRATE 50 UG/ML
25 INJECTION, SOLUTION INTRAMUSCULAR; INTRAVENOUS EVERY 5 MIN PRN
Status: DISCONTINUED | OUTPATIENT
Start: 2021-12-13 | End: 2021-12-13 | Stop reason: HOSPADM

## 2021-12-13 RX ORDER — ONDANSETRON 4 MG/1
4 TABLET, ORALLY DISINTEGRATING ORAL EVERY 30 MIN PRN
Status: DISCONTINUED | OUTPATIENT
Start: 2021-12-13 | End: 2021-12-13 | Stop reason: HOSPADM

## 2021-12-13 RX ORDER — OXYCODONE HYDROCHLORIDE 5 MG/1
5 TABLET ORAL EVERY 4 HOURS PRN
Status: DISCONTINUED | OUTPATIENT
Start: 2021-12-13 | End: 2021-12-13 | Stop reason: HOSPADM

## 2021-12-13 RX ORDER — BUPIVACAINE HYDROCHLORIDE 2.5 MG/ML
INJECTION, SOLUTION EPIDURAL; INFILTRATION; INTRACAUDAL PRN
Status: DISCONTINUED | OUTPATIENT
Start: 2021-12-13 | End: 2021-12-13 | Stop reason: HOSPADM

## 2021-12-13 RX ORDER — ACETAMINOPHEN 500 MG
1000 TABLET ORAL EVERY 6 HOURS PRN
COMMUNITY

## 2021-12-13 RX ORDER — MAGNESIUM HYDROXIDE 1200 MG/15ML
LIQUID ORAL PRN
Status: DISCONTINUED | OUTPATIENT
Start: 2021-12-13 | End: 2021-12-13 | Stop reason: HOSPADM

## 2021-12-13 RX ORDER — KETOROLAC TROMETHAMINE 30 MG/ML
INJECTION, SOLUTION INTRAMUSCULAR; INTRAVENOUS PRN
Status: DISCONTINUED | OUTPATIENT
Start: 2021-12-13 | End: 2021-12-13

## 2021-12-13 RX ORDER — ONDANSETRON 2 MG/ML
INJECTION INTRAMUSCULAR; INTRAVENOUS PRN
Status: DISCONTINUED | OUTPATIENT
Start: 2021-12-13 | End: 2021-12-13

## 2021-12-13 RX ORDER — IBUPROFEN 600 MG/1
600 TABLET, FILM COATED ORAL EVERY 6 HOURS PRN
Qty: 30 TABLET | Refills: 0 | Status: SHIPPED | OUTPATIENT
Start: 2021-12-13

## 2021-12-13 RX ORDER — FENTANYL CITRATE 50 UG/ML
25 INJECTION, SOLUTION INTRAMUSCULAR; INTRAVENOUS
Status: DISCONTINUED | OUTPATIENT
Start: 2021-12-13 | End: 2021-12-13 | Stop reason: HOSPADM

## 2021-12-13 RX ADMIN — PROPOFOL 50 MG: 10 INJECTION, EMULSION INTRAVENOUS at 07:40

## 2021-12-13 RX ADMIN — LIDOCAINE HYDROCHLORIDE 40 MG: 20 INJECTION, SOLUTION INFILTRATION; PERINEURAL at 07:40

## 2021-12-13 RX ADMIN — DEXAMETHASONE SODIUM PHOSPHATE 4 MG: 4 INJECTION, SOLUTION INTRAMUSCULAR; INTRAVENOUS at 07:40

## 2021-12-13 RX ADMIN — ONDANSETRON HYDROCHLORIDE 4 MG: 2 SOLUTION INTRAMUSCULAR; INTRAVENOUS at 07:40

## 2021-12-13 RX ADMIN — MIDAZOLAM HYDROCHLORIDE 2 MG: 1 INJECTION, SOLUTION INTRAMUSCULAR; INTRAVENOUS at 07:39

## 2021-12-13 RX ADMIN — FENTANYL CITRATE 25 MCG: 50 INJECTION, SOLUTION INTRAMUSCULAR; INTRAVENOUS at 07:39

## 2021-12-13 RX ADMIN — SODIUM CHLORIDE, POTASSIUM CHLORIDE, SODIUM LACTATE AND CALCIUM CHLORIDE: 600; 310; 30; 20 INJECTION, SOLUTION INTRAVENOUS at 07:10

## 2021-12-13 RX ADMIN — KETOROLAC TROMETHAMINE 30 MG: 30 INJECTION, SOLUTION INTRAMUSCULAR at 07:40

## 2021-12-13 RX ADMIN — PROPOFOL 100 MCG/KG/MIN: 10 INJECTION, EMULSION INTRAVENOUS at 07:41

## 2021-12-13 ASSESSMENT — LIFESTYLE VARIABLES: TOBACCO_USE: 1

## 2021-12-13 ASSESSMENT — MIFFLIN-ST. JEOR: SCORE: 1490.28

## 2021-12-13 NOTE — ANESTHESIA POSTPROCEDURE EVALUATION
Patient: Dia Mcclain    Procedure: Procedure(s):  RELEASE, CARPAL TUNNEL       Diagnosis:Carpal tunnel syndrome [G56.00]  Diagnosis Additional Information: No value filed.    Anesthesia Type:  MAC    Note:  Disposition: Outpatient   Postop Pain Control: Uneventful            Sign Out: Well controlled pain   PONV: No   Neuro/Psych: Uneventful            Sign Out: Acceptable/Baseline neuro status   Airway/Respiratory: Uneventful            Sign Out: Acceptable/Baseline resp. status   CV/Hemodynamics: Uneventful            Sign Out: Acceptable CV status; No obvious hypovolemia; No obvious fluid overload   Other NRE: NONE   DID A NON-ROUTINE EVENT OCCUR? No           Last vitals:  Vitals Value Taken Time   /71 12/13/21 0900   Temp 97.7  F (36.5  C) 12/13/21 0900   Pulse 67 12/13/21 0900   Resp     SpO2 96 % 12/13/21 0904   Vitals shown include unvalidated device data.    Electronically Signed By: SAL Caro CRNA  December 13, 2021  9:16 AM

## 2021-12-13 NOTE — OP NOTE
Procedure Date: 2021    PREOPERATIVE DIAGNOSIS:  Right carpal tunnel syndrome.    POSTOPERATIVE DIAGNOSIS:  Right carpal tunnel syndrome.    OPERATION:  Right carpal tunnel release.    SURGEON:  Donald Murdock MD    ASSISTANT:  None.    ANESTHESIA:  Local with MAC.    INDICATIONS FOR PROCEDURE:  Ms. Mercedes London is a 41-year-old female with right carpal tunnel syndrome.  She wished to have her hand definitively treated.  All the risks and benefits of surgical intervention were discussed with her, and she wished to proceed.    DESCRIPTION OF PROCEDURE:  The patient was taken to the operating room.  After adequate induction of anesthesia, she was positioned, prepped and draped in usual sterile fashion on the operative table.  Universal protocol timeout was initiated.  Once all in the room in agreement, incision was made overlying the transverse carpal ligament.  It was carried down through subcutaneous tissues and down to the palmar fascia.  Palmar fascia was incised in line with the limb and the retractors were placed deeper.  The transverse carpal ligament was then encountered and incised in line with the limb.  The ligament was sectioned to its distal-most extent, utilizing direct visualization and a 15 blade.  The ligament was then sectioned proximally utilizing Metzenbaum scissors after clearing soft tissues both volar and dorsal to the ligament.  Adequate release was documented with direct visualization, and the wound was copiously irrigated.  4-0 nylon was used to close the skin.  The patient was taken in stable condition to postanesthesia care unit.    Donald Murdock MD        D: 2021   T: 2021   MT: [facility]    Name:     MERCEDES LONDON  MRN:      [id number]        Account:        [id number]   :      1980           Procedure Date: 2021     Document: [id number]

## 2021-12-13 NOTE — DISCHARGE INSTRUCTIONS
[facility]  Adult Discharge Orders & Instructions    ________________________________________________________________          For 12 hours after surgery  1. Get plenty of rest.  A responsible adult must stay with you for at least 12 hours after you leave the hospital.   2. You may feel lightheaded.  IF so, sit for a few minutes before standing.  Have someone help you get up.   3. You may have a slight fever. Call the doctor if your fever is over 101 F (38.3 C) (taken under the tongue) or lasts longer than 24 hours.  4. You may have a dry mouth, a sore throat, muscle aches or trouble sleeping.  These should go away after 24 hours.  5. Do not make important or legal decisions.  6.   Do not drive or use heavy equipment.  If you have weakness or tingling, don't drive or use heavy equipment until this feeling goes away.    To contact a doctor, call   [phone]_______________________    Follow up appointment with Dr. Murdock on Weds. December 29th, 2021 at 10:30 in [place].    Surgeon Contact Information  If you have questions or concerns related to your procedure please contact your surgeon through [facility] at [phone].

## 2021-12-13 NOTE — BRIEF OP NOTE
[facility]    Brief Operative Note    Pre-operative diagnosis: Carpal tunnel syndrome [G56.00]  Post-operative diagnosis Same as pre-operative diagnosis    Procedure: Procedure(s):  RELEASE, CARPAL TUNNEL - Right  Surgeon: Surgeon(s) and Role:     * Donald Murdock MD - Primary  Anesthesia: Monitor Anesthesia Care   Estimated Blood Loss: None    Drains: None  Specimens: * No specimens in log *  Findings:   None.  Complications: None.  Implants: * No implants in log *

## 2021-12-13 NOTE — OR NURSING
Chely has been discharged to home at 0915 via ambulatory accompanied by Aroldo, .    Written discharge instructions were provided to Chely and Aroldo.  Prescriptions were printed and picked up by Aroldo.  Patient states their pain is 0/10, numbness still present, and denies any nausea or dizziness upon discharge.    Patient and adult caring for them verbalize understanding of discharge instructions including no driving until tomorrow and no longer taking narcotic pain medications - no operating mechanical equipment and no making any important decisions.They understand reason for discharge, and necessary follow-up appointments.      Silvia Delgado, RN

## 2021-12-13 NOTE — ANESTHESIA CARE TRANSFER NOTE
Patient: Dia Mcclain    Procedure: Procedure(s):  RELEASE, CARPAL TUNNEL       Diagnosis: Carpal tunnel syndrome [G56.00]  Diagnosis Additional Information: No value filed.    Anesthesia Type:   MAC     Note:    Oropharynx: oropharynx clear of all foreign objects and spontaneously breathing  Level of Consciousness: drowsy  Oxygen Supplementation: room air    Independent Airway: airway patency satisfactory and stable  Dentition: dentition unchanged  Vital Signs Stable: post-procedure vital signs reviewed and stable  Report to RN Given: handoff report given  Patient transferred to: Phase II    Handoff Report: Identifed the Patient, Identified the Reponsible Provider, Reviewed the pertinent medical history, Discussed the surgical course, Reviewed Intra-OP anesthesia mangement and issues during anesthesia, Set expectations for post-procedure period and Allowed opportunity for questions and acknowledgement of understanding      Vitals:  Vitals Value Taken Time   BP 96/64 12/13/21 0805   Temp     Pulse 77 12/13/21 0805   Resp     SpO2 90 % 12/13/21 0806   Vitals shown include unvalidated device data.    Electronically Signed By: SAL Caro CRNA  December 13, 2021  8:07 AM

## 2021-12-13 NOTE — ANESTHESIA PREPROCEDURE EVALUATION
Anesthesia Pre-Procedure Evaluation    Patient: Dia Mcclain   MRN: [id number] : 1980        Preoperative Diagnosis: Carpal tunnel syndrome [G56.00]    Procedure : Procedure(s):  RELEASE, CARPAL TUNNEL          History reviewed. No pertinent past medical history.   Past Surgical History:   Procedure Laterality Date     APPENDECTOMY       BUNIONECTOMY       C ORAL SURGERY SINGLE TOOTH       HC REMOVAL IUD VIA HYSTEROSCOPY        Allergies   Allergen Reactions     Allegra [Fexofenadine]       Social History     Tobacco Use     Smoking status: Current Every Day Smoker     Packs/day: 0.50     Types: Cigarettes     Smokeless tobacco: Never Used   Substance Use Topics     Alcohol use: Not Currently      Wt Readings from Last 1 Encounters:   21 81.6 kg (180 lb)        Anesthesia Evaluation   Pt has had prior anesthetic.     No history of anesthetic complications       ROS/MED HX  ENT/Pulmonary:     (+) tobacco use, Current use, 1 packs/day,     Neurologic: Comment: Carpal Tunnel Syndrome Right      Cardiovascular:       METS/Exercise Tolerance: >4 METS    Hematologic:  - neg hematologic  ROS     Musculoskeletal:  - neg musculoskeletal ROS     GI/Hepatic:  - neg GI/hepatic ROS     Renal/Genitourinary:  - neg Renal ROS     Endo:  - neg endo ROS     Psychiatric/Substance Use:  - neg psychiatric ROS     Infectious Disease:  - neg infectious disease ROS     Malignancy:  - neg malignancy ROS     Other:  - neg other ROS          Physical Exam    Airway        Mallampati: I   TM distance: > 3 FB   Neck ROM: full   Mouth opening: > 3 cm    Respiratory Devices and Support         Dental       (+) upper dentures and lower dentures      Cardiovascular   cardiovascular exam normal       Rhythm and rate: regular and normal     Pulmonary   pulmonary exam normal        breath sounds clear to auscultation           OUTSIDE LABS:  CBC:   Lab Results   Component Value Date    WBC 9.0 2021    HGB 11.8 2021     HCT 35.3 11/30/2021     11/30/2021     BMP:   Lab Results   Component Value Date     11/30/2021    POTASSIUM 3.7 11/30/2021    CHLORIDE 106 11/30/2021    CO2 28 11/30/2021    BUN 9 11/30/2021    CR 0.86 11/30/2021    GLC 92 11/30/2021     COAGS: No results found for: PTT, INR, FIBR  POC: No results found for: BGM, HCG, HCGS  HEPATIC: No results found for: ALBUMIN, PROTTOTAL, ALT, AST, GGT, ALKPHOS, BILITOTAL, BILIDIRECT, SHELBY  OTHER:   Lab Results   Component Value Date    CATINA 9.6 11/30/2021       Anesthesia Plan    ASA Status:  2   NPO Status:  NPO Appropriate    Anesthesia Type: MAC.     - Reason for MAC: straight local not clinically adequate              Consents    Anesthesia Plan(s) and associated risks, benefits, and realistic alternatives discussed. Questions answered and patient/representative(s) expressed understanding.    - Discussed:     - Discussed with:  Patient      - Extended Intubation/Ventilatory Support Discussed: No.      - Patient is DNR/DNI Status: No    Use of blood products discussed: No .     Postoperative Care    Pain management: IV analgesics.   PONV prophylaxis: Ondansetron (or other 5HT-3)     Comments:                SAL Caro CRNA

## 2021-12-20 NOTE — PROGRESS NOTES
Clarification in medication waste.  25 micrograms of Fentanyl were administered to Ms Mcclain for her carpal tunnel release during surgery on 12/13/2021. 75 micrograms were wasted in the [employer]. (Error with previous documentation with 25 microgram waste).    SAL Caro CRNA on 12/20/2021 at 9:51 AM

## 2021-12-26 ENCOUNTER — OFFICE VISIT (OUTPATIENT)
Dept: FAMILY MEDICINE | Facility: OTHER | Age: 41
End: 2021-12-26
Attending: ORTHOPAEDIC SURGERY
Payer: OTHER GOVERNMENT

## 2021-12-26 DIAGNOSIS — Z01.818 PREOP GENERAL PHYSICAL EXAM: Primary | ICD-10-CM

## 2021-12-26 PROCEDURE — U0005 INFEC AGEN DETEC AMPLI PROBE: HCPCS

## 2021-12-26 PROCEDURE — U0003 INFECTIOUS AGENT DETECTION BY NUCLEIC ACID (DNA OR RNA); SEVERE ACUTE RESPIRATORY SYNDROME CORONAVIRUS 2 (SARS-COV-2) (CORONAVIRUS DISEASE [COVID-19]), AMPLIFIED PROBE TECHNIQUE, MAKING USE OF HIGH THROUGHPUT TECHNOLOGIES AS DESCRIBED BY CMS-2020-01-R: HCPCS

## 2021-12-27 LAB — SARS-COV-2 RNA RESP QL NAA+PROBE: NEGATIVE

## 2022-07-25 ENCOUNTER — ALLIED HEALTH/NURSE VISIT (OUTPATIENT)
Dept: FAMILY MEDICINE | Facility: OTHER | Age: 42
End: 2022-07-25
Attending: ORTHOPAEDIC SURGERY
Payer: OTHER GOVERNMENT

## 2022-07-25 DIAGNOSIS — Z20.822 COVID-19 RULED OUT: Primary | ICD-10-CM

## 2022-07-25 PROCEDURE — U0005 INFEC AGEN DETEC AMPLI PROBE: HCPCS

## 2022-07-25 PROCEDURE — U0003 INFECTIOUS AGENT DETECTION BY NUCLEIC ACID (DNA OR RNA); SEVERE ACUTE RESPIRATORY SYNDROME CORONAVIRUS 2 (SARS-COV-2) (CORONAVIRUS DISEASE [COVID-19]), AMPLIFIED PROBE TECHNIQUE, MAKING USE OF HIGH THROUGHPUT TECHNOLOGIES AS DESCRIBED BY CMS-2020-01-R: HCPCS

## 2022-07-26 LAB — SARS-COV-2 RNA RESP QL NAA+PROBE: NEGATIVE

## 2024-05-23 ENCOUNTER — MEDICAL CORRESPONDENCE (OUTPATIENT)
Dept: MRI IMAGING | Facility: HOSPITAL | Age: 44
End: 2024-05-23

## 2024-06-04 ENCOUNTER — HOSPITAL ENCOUNTER (OUTPATIENT)
Dept: MRI IMAGING | Facility: HOSPITAL | Age: 44
Discharge: HOME | End: 2024-06-04
Attending: INTERNAL MEDICINE | Admitting: INTERNAL MEDICINE
Payer: COMMERCIAL

## 2024-06-04 DIAGNOSIS — M54.50 LOW BACK PAIN, UNSPECIFIED: ICD-10-CM

## 2024-06-04 DIAGNOSIS — G89.29 OTHER CHRONIC PAIN: ICD-10-CM

## 2024-06-04 PROCEDURE — 72195 MRI PELVIS W/O DYE: CPT

## 2024-07-14 ENCOUNTER — HEALTH MAINTENANCE LETTER (OUTPATIENT)
Age: 44
End: 2024-07-14

## 2024-09-09 ENCOUNTER — HOSPITAL ENCOUNTER (EMERGENCY)
Facility: HOSPITAL | Age: 44
Discharge: HOME | End: 2024-09-09
Payer: COMMERCIAL

## 2024-09-09 VITALS
DIASTOLIC BLOOD PRESSURE: 92 MMHG | HEART RATE: 69 BPM | OXYGEN SATURATION: 97 % | TEMPERATURE: 98.2 F | BODY MASS INDEX: 29.16 KG/M2 | SYSTOLIC BLOOD PRESSURE: 126 MMHG | HEIGHT: 65 IN | WEIGHT: 175 LBS | RESPIRATION RATE: 16 BRPM

## 2024-09-09 DIAGNOSIS — J01.90 ACUTE SINUSITIS WITH SYMPTOMS > 10 DAYS: ICD-10-CM

## 2024-09-09 PROCEDURE — 99213 OFFICE O/P EST LOW 20 MIN: CPT

## 2024-09-09 PROCEDURE — G0463 HOSPITAL OUTPT CLINIC VISIT: HCPCS

## 2024-09-09 RX ORDER — DEXTROAMPHETAMINE SACCHARATE, AMPHETAMINE ASPARTATE MONOHYDRATE, DEXTROAMPHETAMINE SULFATE AND AMPHETAMINE SULFATE 7.5; 7.5; 7.5; 7.5 MG/1; MG/1; MG/1; MG/1
CAPSULE, EXTENDED RELEASE ORAL
COMMUNITY
Start: 2024-08-27

## 2024-09-09 ASSESSMENT — ENCOUNTER SYMPTOMS
VOMITING: 0
CHILLS: 0
SINUS PRESSURE: 1
NAUSEA: 0
DIARRHEA: 0
SINUS PAIN: 1
ACTIVITY CHANGE: 0
COUGH: 0
ABDOMINAL PAIN: 0
FEVER: 0
APPETITE CHANGE: 0
SHORTNESS OF BREATH: 0
HEADACHES: 1
SORE THROAT: 0

## 2024-09-09 NOTE — DISCHARGE INSTRUCTIONS
Augmentin 2 times daily for 7 days.   Flonase nasal spray once daily for 5 days.   Push fluids.   Tylenol and ibuprofen as directed.   Yogurt or probiotic while taking antibiotics.   Return with any new or concerning symptoms.   Follow up in the clinic as needed.

## 2024-09-09 NOTE — ED TRIAGE NOTES
Pt presents with concerns of ear pain, vomiting, itchy eyes and green phlegm, symptom onset was approx. 1 month ago.

## 2024-09-09 NOTE — ED PROVIDER NOTES
History     Chief Complaint   Patient presents with    Otalgia     HPI  Dia Mcclain is a 44 year old female who presents to the urgent care with 1 month history of worsening congestion, sinus pressure, bilateral ear pain, and headaches. She denies n/v/d, cough, and sore throat. Denies ill contacts. Has been taking tylenol and ibuprofen along with nasal spray without improvement. No recent abx.     Allergies:  Allergies   Allergen Reactions    Allegra [Fexofenadine]        Problem List:    There are no problems to display for this patient.       Past Medical History:    No past medical history on file.    Past Surgical History:    Past Surgical History:   Procedure Laterality Date    APPENDECTOMY      BUNIONECTOMY      C ORAL SURGERY SINGLE TOOTH      HC REMOVAL IUD VIA HYSTEROSCOPY      RELEASE CARPAL TUNNEL Right 12/13/2021    Procedure: RELEASE, CARPAL TUNNEL;  Surgeon: Donald Murdock MD;  Location:  OR       Family History:    No family history on file.    Social History:  Marital Status:   [2]  Social History     Tobacco Use    Smoking status: Every Day     Current packs/day: 0.50     Types: Cigarettes    Smokeless tobacco: Never   Vaping Use    Vaping status: Never Used   Substance Use Topics    Alcohol use: Not Currently    Drug use: Not Currently        Medications:    acetaminophen (TYLENOL) 500 MG tablet  amoxicillin-clavulanate (AUGMENTIN) 875-125 MG tablet  amphetamine-dextroamphetamine (ADDERALL XR) 30 MG 24 hr capsule  ibuprofen (ADVIL/MOTRIN) 200 MG tablet  vitamin B-Complex  HYDROcodone-acetaminophen (NORCO) 5-325 MG tablet  ibuprofen (ADVIL/MOTRIN) 600 MG tablet          Review of Systems   Constitutional:  Negative for activity change, appetite change, chills and fever.   HENT:  Positive for congestion, ear pain, sinus pressure and sinus pain. Negative for sore throat.    Respiratory:  Negative for cough and shortness of breath.    Gastrointestinal:  Negative for abdominal pain,  "diarrhea, nausea and vomiting.   Neurological:  Positive for headaches.   All other systems reviewed and are negative.      Physical Exam   BP: 126/92  Pulse: 69  Temp: 98.2  F (36.8  C)  Resp: 16  Height: 165.1 cm (5' 5\")  Weight: 79.4 kg (175 lb)  SpO2: 97 %      Physical Exam  Vitals and nursing note reviewed.   Constitutional:       General: She is not in acute distress.     Appearance: Normal appearance. She is ill-appearing. She is not toxic-appearing or diaphoretic.   HENT:      Right Ear: A middle ear effusion (non purulent) is present. Tympanic membrane is bulging. Tympanic membrane is not erythematous.      Left Ear: A middle ear effusion (non purulent) is present. Tympanic membrane is bulging. Tympanic membrane is not erythematous.      Nose: No congestion or rhinorrhea.      Right Sinus: Maxillary sinus tenderness and frontal sinus tenderness present.      Left Sinus: Maxillary sinus tenderness and frontal sinus tenderness present.      Mouth/Throat:      Mouth: Mucous membranes are moist.      Pharynx: Oropharynx is clear. No oropharyngeal exudate or posterior oropharyngeal erythema.   Cardiovascular:      Rate and Rhythm: Normal rate and regular rhythm.      Heart sounds: Normal heart sounds. No murmur heard.  Pulmonary:      Breath sounds: No wheezing, rhonchi or rales.   Neurological:      Mental Status: She is alert.         ED Course        Procedures    No results found for this or any previous visit (from the past 24 hour(s)).    Medications - No data to display    Assessments & Plan (with Medical Decision Making)     I have reviewed the nursing notes.    I have reviewed the findings, diagnosis, plan and need for follow up with the patient.  Dia Mcclain is a 44 year old female who presents to the urgent care with 1 month history of worsening congestion, sinus pressure, bilateral ear pain, and headaches. She denies n/v/d, cough, and sore throat. Denies ill contacts. Has been taking tylenol " and ibuprofen along with nasal spray without improvement. No recent abx.     MDM: vital signs normal, afebrile. Non toxic in appearance with no noted distress. Lungs clear, heart tones regular. Given her length of symptoms with bilateral frontal and maxillary sinus pressure, most likely bacterial at this time. Discussed viral and bacterial etiologies. Augmentin prescribed. Supportive measures and return precautions discussed. She is in agreement with plan.     (J01.90) Acute sinusitis with symptoms > 10 days  Plan: Augmentin 2 times daily for 7 days.   Flonase nasal spray once daily for 5 days.   Push fluids.   Tylenol and ibuprofen as directed.   Yogurt or probiotic while taking antibiotics.   Return with any new or concerning symptoms.   Follow up in the clinic as needed. Understanding verbalized.       Discharge Medication List as of 9/9/2024  3:57 PM        START taking these medications    Details   amoxicillin-clavulanate (AUGMENTIN) 875-125 MG tablet Take 1 tablet by mouth 2 times daily for 7 days., Disp-14 tablet, R-0, E-Prescribe             Final diagnoses:   Acute sinusitis with symptoms > 10 days       9/9/2024   [facility]       Whitney Junior NP  09/09/24 0216

## 2025-07-19 ENCOUNTER — HEALTH MAINTENANCE LETTER (OUTPATIENT)
Age: 45
End: 2025-07-19

## 2025-08-09 ENCOUNTER — HEALTH MAINTENANCE LETTER (OUTPATIENT)
Age: 45
End: 2025-08-09

## (undated) DEVICE — DRSG GAUZE 4X4" 3033

## (undated) DEVICE — LIGHT HANDLES PLASTIC

## (undated) DEVICE — SU ETHILON 4-0 FS-2 18" 662H

## (undated) DEVICE — BNDG ELASTIC 3"X5YDS UNSTERILE 6611-30

## (undated) DEVICE — DRSG KERLIX 2 1/4"X3YDS ROLL 6720

## (undated) DEVICE — DRSG ADAPTIC 3X3" 2012

## (undated) DEVICE — GLOVE BIOGEL PI SZ 8.5 40885

## (undated) DEVICE — SYR 10ML FINGER CONTROL W/O NDL 309695

## (undated) DEVICE — TOURNIQUET SGL BLADDER 18"X4" RED 5921-218-135

## (undated) DEVICE — PREP CHLORAPREP ORANGE 3ML  260415

## (undated) DEVICE — SOL WATER 1500ML

## (undated) DEVICE — DRSG KERLIX SUPER SPONGE 7310

## (undated) DEVICE — PREP CHLORAPREP 26ML TINTED ORANGE  260815

## (undated) DEVICE — GLOVE PROTEXIS POWDER FREE SMT 8.5 2D72PT85X

## (undated) DEVICE — BLADE KNIFE SURG 15 371115

## (undated) DEVICE — PACK MAJOR EXTREMITY SOP15MEFCA

## (undated) DEVICE — DRAPE STERI TOWEL LG 1010

## (undated) DEVICE — SLEEVE COMPRESSION SCD KNEE MED 74022

## (undated) RX ORDER — DEXAMETHASONE SODIUM PHOSPHATE 4 MG/ML
INJECTION, SOLUTION INTRA-ARTICULAR; INTRALESIONAL; INTRAMUSCULAR; INTRAVENOUS; SOFT TISSUE
Status: DISPENSED
Start: 2021-12-13

## (undated) RX ORDER — PROPOFOL 10 MG/ML
INJECTION, EMULSION INTRAVENOUS
Status: DISPENSED
Start: 2021-12-13

## (undated) RX ORDER — NEOMYCIN/BACITRACIN/POLYMYXINB 3.5-400-5K
OINTMENT (GRAM) TOPICAL
Status: DISPENSED
Start: 2021-12-13

## (undated) RX ORDER — ONDANSETRON 2 MG/ML
INJECTION INTRAMUSCULAR; INTRAVENOUS
Status: DISPENSED
Start: 2021-12-13

## (undated) RX ORDER — FENTANYL CITRATE 50 UG/ML
INJECTION, SOLUTION INTRAMUSCULAR; INTRAVENOUS
Status: DISPENSED
Start: 2021-12-13

## (undated) RX ORDER — BUPIVACAINE HYDROCHLORIDE 2.5 MG/ML
INJECTION, SOLUTION EPIDURAL; INFILTRATION; INTRACAUDAL
Status: DISPENSED
Start: 2021-12-13

## (undated) RX ORDER — LIDOCAINE HYDROCHLORIDE 20 MG/ML
INJECTION, SOLUTION EPIDURAL; INFILTRATION; INTRACAUDAL; PERINEURAL
Status: DISPENSED
Start: 2021-12-13

## (undated) RX ORDER — KETOROLAC TROMETHAMINE 30 MG/ML
INJECTION, SOLUTION INTRAMUSCULAR; INTRAVENOUS
Status: DISPENSED
Start: 2021-12-13